# Patient Record
(demographics unavailable — no encounter records)

---

## 2024-10-14 NOTE — ASSESSMENT
[FreeTextEntry1] :  57 M recent admission with strep agalactiae bacteremia 2/2 pneumonia discharged with 2 week course of iv ceftriaxone end 8/10/24. After completing this, Patient reported persistent intermittent back pain since discharge, along with fever and night sweats after completing abx. He was sent for Ct thoracic spine which reported new t6-t7, t9-t10 discitis and patient was sent to ED for further workup. MR spine w/con with T6/T7 and T9/T10 discitis/OM. No evidence of epidural involvement  TTE was wnl last admission, pt refused TARUN, repeat TARUN no vegetations  He was discharged on ceftriaxone 2g IV once daily, plan for PICC end date 10/25/24, weekly cbc cmp esr crp He is here for post hospitalization f/u visit weekly labs reviewed clinically improved complete abx as above f/u neurosurgery, f/u repeat MR t spine  all questions and concerns addressed f/u ID 1 month to repeat BCX  [Treatment Education] : treatment education [Treatment Adherence] : treatment adherence [Anticipatory Guidance] : anticipatory guidance

## 2024-10-14 NOTE — HISTORY OF PRESENT ILLNESS
[FreeTextEntry1] :  Hospital Course: Discharge Date	17-Sep-2024 	 Admission Date	13-Sep-2024 13:24 Reason for Admission	OM/Discitis Hospital Course	 56 yo male with vitiligo presents with persistent discomfort in his upper back, described as a 'knotty' feeling with occasional associated stinging sensations. Patient reports that he had Strep bacteremia from pneumonia at the end of July and was treated with IV antibiotics. Patient then went to see his infectious disease dr where he complained of back pain. He then had a CAT scan which suggested discitis/OM of T6-T7 and T9-T10 region. Patient was then told to come to the ED for an evaluation. Seen by NS and ID and recommended admission to medicine for further management. Blood cx are negative , treated with iv rocephin , PICC  Interval f/u 10/14/24- pt doing well back pain much improved noted back "crack" last week but denies pain no fever no chills tolerating abx

## 2024-10-14 NOTE — PHYSICAL EXAM
[General Appearance - Alert] : alert [General Appearance - In No Acute Distress] : in no acute distress [Auscultation Breath Sounds / Voice Sounds] : lungs were clear to auscultation bilaterally [Heart Rate And Rhythm] : heart rate was normal and rhythm regular [Heart Sounds] : normal S1 and S2 [Heart Sounds Gallop] : no gallops [Murmurs] : no murmurs [Heart Sounds Pericardial Friction Rub] : no pericardial rub [Bowel Sounds] : normal bowel sounds [Abdomen Soft] : soft [Abdomen Tenderness] : non-tender [] : no hepato-splenomegaly [Abdomen Mass (___ Cm)] : no abdominal mass palpated [Costovertebral Angle Tenderness] : no CVA tenderness [Musculoskeletal - Swelling] : no joint swelling [Nail Clubbing] : no clubbing  or cyanosis of the fingernails [Motor Tone] : muscle strength and tone were normal [FreeTextEntry1] : vitiligo

## 2024-12-27 NOTE — ASSESSMENT
[Treatment Education] : treatment education [Treatment Adherence] : treatment adherence [Anticipatory Guidance] : anticipatory guidance [FreeTextEntry1] :  57 M PMH strep agalactiae bacteremia 2/2 pneumonia discharged with 2 week course of iv ceftriaxone end 8/10/24. After completing this, Patient reported persistent intermittent back pain since discharge, along with fever and night sweats after completing abx. He was sent for Ct thoracic spine which reported new t6-t7, t9-t10 discitis and patient was sent to ED for further workup. MR spine w/con with T6/T7 and T9/T10 discitis/OM. No evidence of epidural involvement  TTE was wnl initial admission, pt refused TARUN, repeat TTE was negative  He was discharged on ceftriaxone 2g IV once daily, plan for PICC end date 10/25/24, weekly cbc cmp esr crp which he completed Reports feeling overall well and continues to feel better. Clinically improved and has had no signs/symptoms of infection since discontinuing antibiotics   plan Will check labs cbc cmp esr crp BCX suggest f/u mri next month and NSGY f/u- prior MRi was done within a week after completing IV abx, agree findings may reflect radiographic lag f/u after mri not patient had refused TARUN on last admission derm eval for rash Ed if worsening fever or back pain

## 2024-12-27 NOTE — PHYSICAL EXAM
[General Appearance - Alert] : alert [General Appearance - In No Acute Distress] : in no acute distress [Auscultation Breath Sounds / Voice Sounds] : lungs were clear to auscultation bilaterally [Heart Rate And Rhythm] : heart rate was normal and rhythm regular [Heart Sounds] : normal S1 and S2 [Heart Sounds Gallop] : no gallops [Murmurs] : no murmurs [Heart Sounds Pericardial Friction Rub] : no pericardial rub [Bowel Sounds] : normal bowel sounds [Abdomen Soft] : soft [Abdomen Tenderness] : non-tender [Abdomen Mass (___ Cm)] : no abdominal mass palpated [] : no hepato-splenomegaly [Costovertebral Angle Tenderness] : no CVA tenderness [Musculoskeletal - Swelling] : no joint swelling [Nail Clubbing] : no clubbing  or cyanosis of the fingernails [Motor Tone] : muscle strength and tone were normal [FreeTextEntry1] : vitiligo,  upper chest small maculopapular lesions [No Focal Deficits] : no focal deficits

## 2024-12-27 NOTE — PHYSICAL EXAM
[General Appearance - Alert] : alert [General Appearance - In No Acute Distress] : in no acute distress [Auscultation Breath Sounds / Voice Sounds] : lungs were clear to auscultation bilaterally [Heart Rate And Rhythm] : heart rate was normal and rhythm regular [Heart Sounds] : normal S1 and S2 [Heart Sounds Gallop] : no gallops [Murmurs] : no murmurs [Heart Sounds Pericardial Friction Rub] : no pericardial rub [Bowel Sounds] : normal bowel sounds [Abdomen Soft] : soft [Abdomen Tenderness] : non-tender [] : no hepato-splenomegaly [Abdomen Mass (___ Cm)] : no abdominal mass palpated [Costovertebral Angle Tenderness] : no CVA tenderness [Musculoskeletal - Swelling] : no joint swelling [Nail Clubbing] : no clubbing  or cyanosis of the fingernails [Motor Tone] : muscle strength and tone were normal [FreeTextEntry1] : vitiligo,  upper chest small maculopapular lesions [No Focal Deficits] : no focal deficits

## 2024-12-27 NOTE — HISTORY OF PRESENT ILLNESS
[FreeTextEntry1] :  Hospital Course: Discharge Date	17-Sep-2024 	 Admission Date	13-Sep-2024 13:24 Reason for Admission	OM/Discitis Hospital Course	 56 yo male with vitiligo presents with persistent discomfort in his upper back, described as a 'knotty' feeling with occasional associated stinging sensations. Patient reports that he had Strep bacteremia from pneumonia at the end of July and was treated with IV antibiotics. Patient then went to see his infectious disease dr where he complained of back pain. He then had a CAT scan which suggested discitis/OM of T6-T7 and T9-T10 region. Patient was then told to come to the ED for an evaluation. Seen by NS and ID and recommended admission to medicine for further management. Blood cx are negative , treated with iv rocephin , PICC  Interval f/u 10/14/24- pt doing well back pain much improved noted back "crack" last week but denies pain no fever no chills tolerating abx  Interval f/u 12/23/24- pt overall feeling improved, still feeling fatigued and tired back pain much better but feels some discomfort when exerting eg hanging up mirna decorations no fever or chills rash on mid chest-noted since picc was removed had f/u mri 10/29/24- neurosurgery thought findings reflect radiographic lag saw [facility] ID for 2nd opinion, did labs bcx negative, they felt infection has resolved